# Patient Record
Sex: FEMALE
[De-identification: names, ages, dates, MRNs, and addresses within clinical notes are randomized per-mention and may not be internally consistent; named-entity substitution may affect disease eponyms.]

---

## 2021-08-15 ENCOUNTER — HOSPITAL ENCOUNTER (OUTPATIENT)
Dept: HOSPITAL 53 - M LDO | Age: 26
Discharge: HOME | End: 2021-08-15
Attending: GENERAL PRACTICE
Payer: COMMERCIAL

## 2021-08-15 VITALS — SYSTOLIC BLOOD PRESSURE: 109 MMHG | DIASTOLIC BLOOD PRESSURE: 66 MMHG

## 2021-08-15 VITALS — WEIGHT: 149.47 LBS | BODY MASS INDEX: 30.13 KG/M2 | HEIGHT: 59 IN

## 2021-08-15 VITALS — SYSTOLIC BLOOD PRESSURE: 101 MMHG | DIASTOLIC BLOOD PRESSURE: 63 MMHG

## 2021-08-15 VITALS — DIASTOLIC BLOOD PRESSURE: 57 MMHG | SYSTOLIC BLOOD PRESSURE: 99 MMHG

## 2021-08-15 DIAGNOSIS — O60.03: Primary | ICD-10-CM

## 2021-08-15 DIAGNOSIS — Z3A.34: ICD-10-CM

## 2021-08-15 PROCEDURE — 96372 THER/PROPH/DIAG INJ SC/IM: CPT

## 2021-08-15 PROCEDURE — 59025 FETAL NON-STRESS TEST: CPT

## 2021-08-15 PROCEDURE — 96361 HYDRATE IV INFUSION ADD-ON: CPT

## 2021-08-15 PROCEDURE — 96360 HYDRATION IV INFUSION INIT: CPT

## 2021-08-15 NOTE — IPNPDOC
Obstetrical Progress Note


Date of Service


Aug 15, 2021





Subjective


25 yo  @ 34W5D presents c/o contractions 4 hrs after intercourse. reports

contractions has gotten together and they are about 4/10. she presents becoz 

they was not resolved after a fewhours. denies any vaginal bleeing, loss of 

fluids or decreased fetal movements.





, MOD JUAN,+ACCELS, -Decels-- reactive NST


sve: 1-2/t/h, unchanged after 2 hrs of observation.








A/P


25 yo  @ 34W5D presents c/o contractions 4 hrs after intercourse. 

Hemodynamically stable. no cervical change after 2 hrs, but since initial exam 

was 2 cm, atient was given first dose of betamethasone and will return for 2nd 

dose tomorrow at 6am.


patient was given vistaril for contraction pain, which improved.


she was given strict return precautions and discharged home.


f/u in clinic as previously scheduled.





Objective





Vital Signs








  Date Time  Temp Pulse Resp B/P (MAP) Pulse Ox O2 Delivery O2 Flow Rate FiO2


 


8/15/21 19:11 98.4 84 16 109/66 (80) 98 Room Air  

















LOW PINON MD          Aug 15, 2021 8:47 pm

## 2021-08-16 ENCOUNTER — HOSPITAL ENCOUNTER (OUTPATIENT)
Dept: HOSPITAL 53 - M LDO | Age: 26
Discharge: HOME | End: 2021-08-16
Attending: HEALTH CARE PROVIDER
Payer: COMMERCIAL

## 2021-08-16 VITALS — BODY MASS INDEX: 30.84 KG/M2 | HEIGHT: 59 IN | WEIGHT: 153 LBS

## 2021-08-16 DIAGNOSIS — Z3A.34: ICD-10-CM

## 2021-08-16 DIAGNOSIS — O60.03: Primary | ICD-10-CM

## 2021-08-16 PROCEDURE — 59025 FETAL NON-STRESS TEST: CPT

## 2021-08-16 PROCEDURE — 96372 THER/PROPH/DIAG INJ SC/IM: CPT

## 2021-08-16 NOTE — IPNPDOC
Obstetrical Progress Note


Date of Service


Aug 16, 2021





Subjective


27 yo  @ 34W6D presents for second dose of betamethasone.  Pt initially 

presented over the weekend c/o contractions 4 hrs after intercourse. SVE was 

noted to be 2/T/H and unchanged after 2hr recheck, however due to SVE being 2cm 

with contractions, decision made to provide course of steroids. She is without 

complaint today, denies any further contraction pain.  Second dose administered 

and pt instructed to follow up as scheduled.











RUBY WALLACE M.D.        Aug 16, 2021 18:32

## 2021-08-20 ENCOUNTER — HOSPITAL ENCOUNTER (OUTPATIENT)
Dept: HOSPITAL 53 - M RAD | Age: 26
End: 2021-08-20
Attending: REGISTERED NURSE
Payer: COMMERCIAL

## 2021-08-20 DIAGNOSIS — Z3A.35: ICD-10-CM

## 2021-08-20 DIAGNOSIS — Z36.89: Primary | ICD-10-CM

## 2021-08-20 DIAGNOSIS — O24.419: ICD-10-CM

## 2021-08-20 NOTE — REP
INDICATION:

PREG, FETAL GROWTH-GEST DIABETES



COMPARISON:

None.



TECHNIQUE:

Transabdominal obstetrical ultrasound with color Doppler evaluation.



FINDINGS:

Examination demonstrates a single live intrauterine pregnancy in cephalic

presentation.  Fetal motion is identified by technologist.  Placenta is noted

posterior and  grade 1 without evidence for placenta previa or abruption.  Amniotic

fluid volume is normal.  Cervix measures 5.1 cm in length and appears closed..



Selected gestational age: 35 weeks 3 days with VALE 09/21/2021.

Gestational age by current measurements 35 weeks 2 days with VALE 09/22/2021.



FHR equals 150 beats per minute.



BPD:      8.5 cm at        34 weeks 2 days

HC:         31.9 cm at         35 weeks 6 days

AC:         32.3 cm at      36 weeks 2 days

FL:          6.7 cm at      34 weeks 3 days

HL:          6.1 cm at      35 weeks 2 days

HC/AC: 0.99

Estimated fetal weight 2699 grams (61stpercentile).



ELENO: 8.3 cm (7.8-24.9)

Umbilical artery SD ratio: 1.91 (1.66-3.56)



IMPRESSION:

Single live advanced gestation in cephalic presentation demonstrating appropriate

estimated fetal weight and growth.





<Electronically signed by Ezequiel Miles > 08/20/21 [id number]

## 2021-09-10 ENCOUNTER — HOSPITAL ENCOUNTER (OUTPATIENT)
Dept: HOSPITAL 53 - M LDO | Age: 26
Discharge: HOME | End: 2021-09-10
Attending: OBSTETRICS & GYNECOLOGY
Payer: COMMERCIAL

## 2021-09-10 VITALS — DIASTOLIC BLOOD PRESSURE: 73 MMHG | SYSTOLIC BLOOD PRESSURE: 110 MMHG

## 2021-09-10 DIAGNOSIS — Z3A.38: ICD-10-CM

## 2021-09-10 DIAGNOSIS — O60.03: Primary | ICD-10-CM

## 2021-09-10 PROCEDURE — 59025 FETAL NON-STRESS TEST: CPT

## 2021-09-10 NOTE — IPNPDOC
Text Note


Date of Service


The patient was seen on 9/10/21.





NOTE





25 yo  at 38+2 weeks gestation presents to L&D with the complaint of 

contractions.  She denies any bleeding or leakage of fluid.  She endorses 

regular fetal movement.





Vitals - VSS, afebrile, normotensive, non tachycardic


General - AAOX3, laying in bed, NAD


Abdomen - Gravid uterus, no fundal tenderness


Cervix - 1/50/-3.  Exam unchanged >1hr later.





FHR tracing - Cat I tracing with moderate variability, +accels, no decels, ctx 

intermittent.





Not in active labor.  Reassuring fetal status. Discharged home with return 

precautions.  All questions answered.





30 minutes


Luis Manuel





VS,Rj, I+O


VS, Rj, I+O





Vital Signs








  Date Time  Temp Pulse Resp B/P (MAP) Pulse Ox O2 Delivery O2 Flow Rate FiO2


 


9/10/21 05:31 98.6 93 18 110/73 (85)    

















ISABEL CARPENTER DO         Sep 10, 2021 07:40

## 2021-09-12 ENCOUNTER — HOSPITAL ENCOUNTER (INPATIENT)
Dept: HOSPITAL 53 - M LDO | Age: 26
LOS: 2 days | Discharge: HOME | End: 2021-09-14
Attending: OBSTETRICS & GYNECOLOGY | Admitting: OBSTETRICS & GYNECOLOGY
Payer: COMMERCIAL

## 2021-09-12 VITALS — SYSTOLIC BLOOD PRESSURE: 118 MMHG | DIASTOLIC BLOOD PRESSURE: 76 MMHG

## 2021-09-12 VITALS — SYSTOLIC BLOOD PRESSURE: 118 MMHG | DIASTOLIC BLOOD PRESSURE: 74 MMHG

## 2021-09-12 VITALS — SYSTOLIC BLOOD PRESSURE: 130 MMHG | DIASTOLIC BLOOD PRESSURE: 79 MMHG

## 2021-09-12 VITALS — DIASTOLIC BLOOD PRESSURE: 58 MMHG | SYSTOLIC BLOOD PRESSURE: 95 MMHG

## 2021-09-12 VITALS — SYSTOLIC BLOOD PRESSURE: 152 MMHG | DIASTOLIC BLOOD PRESSURE: 76 MMHG

## 2021-09-12 VITALS — SYSTOLIC BLOOD PRESSURE: 114 MMHG | DIASTOLIC BLOOD PRESSURE: 64 MMHG

## 2021-09-12 VITALS — SYSTOLIC BLOOD PRESSURE: 114 MMHG | DIASTOLIC BLOOD PRESSURE: 77 MMHG

## 2021-09-12 VITALS — SYSTOLIC BLOOD PRESSURE: 123 MMHG | DIASTOLIC BLOOD PRESSURE: 74 MMHG

## 2021-09-12 VITALS — BODY MASS INDEX: 31.07 KG/M2 | HEIGHT: 59 IN | WEIGHT: 154.1 LBS

## 2021-09-12 VITALS — DIASTOLIC BLOOD PRESSURE: 69 MMHG | SYSTOLIC BLOOD PRESSURE: 118 MMHG

## 2021-09-12 VITALS — DIASTOLIC BLOOD PRESSURE: 75 MMHG | SYSTOLIC BLOOD PRESSURE: 114 MMHG

## 2021-09-12 VITALS — DIASTOLIC BLOOD PRESSURE: 51 MMHG | SYSTOLIC BLOOD PRESSURE: 86 MMHG

## 2021-09-12 VITALS — SYSTOLIC BLOOD PRESSURE: 122 MMHG | DIASTOLIC BLOOD PRESSURE: 76 MMHG

## 2021-09-12 VITALS — DIASTOLIC BLOOD PRESSURE: 86 MMHG | SYSTOLIC BLOOD PRESSURE: 134 MMHG

## 2021-09-12 VITALS — SYSTOLIC BLOOD PRESSURE: 120 MMHG | DIASTOLIC BLOOD PRESSURE: 75 MMHG

## 2021-09-12 VITALS — SYSTOLIC BLOOD PRESSURE: 110 MMHG | DIASTOLIC BLOOD PRESSURE: 71 MMHG

## 2021-09-12 VITALS — SYSTOLIC BLOOD PRESSURE: 107 MMHG | DIASTOLIC BLOOD PRESSURE: 73 MMHG

## 2021-09-12 VITALS — SYSTOLIC BLOOD PRESSURE: 123 MMHG | DIASTOLIC BLOOD PRESSURE: 78 MMHG

## 2021-09-12 VITALS — DIASTOLIC BLOOD PRESSURE: 72 MMHG | SYSTOLIC BLOOD PRESSURE: 115 MMHG

## 2021-09-12 DIAGNOSIS — Z3A.38: ICD-10-CM

## 2021-09-12 LAB
BUN SERPL-MCNC: 5 MG/DL (ref 7–18)
CALCIUM SERPL-MCNC: 8.4 MG/DL (ref 8.5–10.1)
CHLORIDE SERPL-SCNC: 107 MEQ/L (ref 98–107)
CO2 SERPL-SCNC: 24 MEQ/L (ref 21–32)
CREAT SERPL-MCNC: 0.38 MG/DL (ref 0.55–1.3)
GFR SERPL CREATININE-BSD FRML MDRD: > 60 ML/MIN/{1.73_M2} (ref 60–?)
GLUCOSE SERPL-MCNC: 70 MG/DL (ref 70–100)
HCT VFR BLD AUTO: 35.3 % (ref 36–47)
HGB BLD-MCNC: 11.7 G/DL (ref 12–15.5)
MCH RBC QN AUTO: 30.2 PG (ref 27–33)
MCHC RBC AUTO-ENTMCNC: 33.1 G/DL (ref 32–36.5)
MCV RBC AUTO: 91.2 FL (ref 80–96)
PLATELET # BLD AUTO: 200 10^3/UL (ref 150–450)
POTASSIUM SERPL-SCNC: 3.7 MEQ/L (ref 3.5–5.1)
RBC # BLD AUTO: 3.87 10^6/UL (ref 4–5.4)
SODIUM SERPL-SCNC: 140 MEQ/L (ref 136–145)
WBC # BLD AUTO: 5.2 10^3/UL (ref 4–10)

## 2021-09-12 PROCEDURE — 10907ZC DRAINAGE OF AMNIOTIC FLUID, THERAPEUTIC FROM PRODUCTS OF CONCEPTION, VIA NATURAL OR ARTIFICIAL OPENING: ICD-10-PCS | Performed by: OBSTETRICS & GYNECOLOGY

## 2021-09-12 RX ADMIN — Medication SCH MLS/HR: at 23:47

## 2021-09-12 NOTE — IPNPDOC
Text Note


Date of Service


The patient was seen on 9/12/21.





NOTE








Patient comfortable with epidural in place.





Chaperoned by RN


Cervix: 8/90/0.  AROM performed productive of clear fluid.





FHR tracing - Cat I with moderate variability, +accels, no decels.  Ctx regular.





Patient progressing well.  Anticipate beginning of 2nd stage soon.





Luis Manuel





VS,Rj, I+O


VS, Rj, I+O


Laboratory Tests


9/12/21 20:11











Vital Signs








  Date Time  Temp Pulse Resp B/P (MAP) Pulse Ox O2 Delivery O2 Flow Rate FiO2


 


9/12/21 19:20 99.0 89 19 134/86 (102)    

















ISABEL CARPENTER DO         Sep 12, 2021 22:20

## 2021-09-12 NOTE — DNPDOC
[facility] Delivery Note


Delivery Note





DATE OF DELIVERY: 2021 at ~2240





PREDELIVERY DIAGNOSIS: 38+5 weeks gestation and active labor





POST DELIVERY DIAGNOSIS: Delivered.





PROCEDURE: Spontaneous vaginal delivery





OBSTETRICIAN: Dr. Issa





ANESTHESIA: Neuraxial (epidural)





ESTIMATED BLOOD LOSS: 200 ml





FINDINGS: pending weight, male infant, Apgar Score 9/9





DELIVERY SUMMARY: Vannesa progressed rapidly after AROM into second stage.  She

started feeling an urge to push and I was called to the room.  The bed was 

broken down and she was prepped for delivery.  With excellent effort over about 

only 5 minutes of pushing her infant delivered.  Presentation was TATYANA with 

restitution to ROT.  The left anterior shoulder delivered with gentle traction 

followed easily by the remainder of the body.  The infant was dried and 

stimulated on the field and a bulb suction was used.  The infant was placed on 

the maternal abdomen and cried vigorously.  The three vessel umbilical cord was 

then clamped and cut by the FOB after appropriate time delay and under my 

direction. Third stage was completed with gentle traction on on the cord and it 

was productive of an intact placenta.  The uterus was firmed with massage and 

pitocin was administered IV bolus.  Inspection of the cervix, vagina, labia, and

perineum revealed no lacerations.  The fundus was palpated again and was firm.  

Sponge, instrument, and needle counts were correct X2.  Mother and infant stable

when I left the room.





DO JAYDEN Cowan CHRISTOPHER J. DO         Sep 12, 2021 22:56

## 2021-09-12 NOTE — HPEPDOC
Obstetrical History & Physical


General


Date of Admission


Sep 12, 2021 at 19:31





History of Present Illness





27 yo  at 38+5 weeks gestation by LMP of [id number] presented to L&D with 

regular, painful contractions.  She also endorses bloody show.  She denies any 

leakage of fluid.  She endorses regular fetal movement.


Chief Complaint:  Contractions, term


Information Provided By:  Patient


Age:  26


:  3


Term:  1


Pre-term:  1


Abortions:  0


Livin





Prenatal Care


Prenatal Care:  Good Care





Prenatal Dating


Final EDC:  Sep 21, 2021


Final EDC for Daily Update:  Sep 21, 2021


Final EDC by:  LMP (LMP of [id number] set VALE of [id number])





Antepartum Course


Prenatal Diagnos(e)s





History of Pre E





Past Medical History


Past Obstetrical History :  


   Past Obstetrical History:  Multigravida (G1 -  at 36 weeks after IOL for 

Pre E, G2 -  at 40 weeks (7lbs, 50z), G3 - current pregnancy)


   Type of Delivery:  Spontaneous Vaginal Del.


GYN History:  No pertinent history


Past Medical History


Medical History





History of Pre E


Surgical History:  Denies/None





Family History


Significant Family History:  No pertinent family hx





Social History


Marital Status:  


Family situation:  Spouse/partner home


Psychosocial History:  No pertinent psych hx


* Smoker:  non-smoker


Alcohol:  Denies


Drugs:  denies





Prenatal Imunizations


Tdap status:  current


Influenza Status:  needs





Allergies


Coded Allergies:  


     No Known Allergies (Unverified , 8/15/21)





Medications


Scheduled


Aspirin (Aspirin) 81 Mg Tab.chew, 1 TAB PO DAILY for pain


Prenatal No.137/Iron/Folic Acd (Prenatal Vitamin Tablet) 1 Each Tablet, 1 TAB PO

DAILY





Physical Examination


Physical Examination





GENERAL: Alert and oriented times three.  Painfully tushar.


ABDOMEN: Gravid and non-tender to touch.


FETUS: Is vertex (VTX) by sterile vaginal examination (SVE),


EXTREMITIES: No edema.





Vital Signs/I&O





Vital Signs








  Date Time  Temp Pulse Resp B/P (MAP) Pulse Ox O2 Delivery O2 Flow Rate FiO2


 


21 19:20 99.0 89 19 134/86 (102)    











Laboratory Data


24H LABS


Laboratory Tests 2


21 19:40: Serology Scanned Report Hepatitis B Testing


Urine Culture:  No Growth





Pertinent Laboratoy Data


Blood Type:  O+


RBC Antibody Screen:  Negative


HIV:  Negative


Hepatitis B:  Negative


Hepatitis C:  Unknown


Rapid Plasma Reagin:  Nonreactive


Rubella:  Immune


Varicella:  Immune


Chlamydia/Gonorrhea:  Unknown


Group B Streptococcus:  Negative


Quad Screen Test:  Unknown (Patient had low risk cff DNA genetic screening)


Cystic Fibrosis:  Unknown


Glucose Tolerance Test:  132





Anatomy Ultrasound


Placenta Location:  Posterior


Normal Anatomy:  Yes


Placenta Previa:  No





 Steroid Therapy


 Steroid Therapy:  No





Vaginal Examination


Dilation:  6 cm


Effacement:  80%


Station:  -2


Cervical Consistency:  Soft


Cervical Position:  Middle


Fetal Presentation:  Cephalic presentation


Fetal Position:  Vertex (occiput)





Fetal Assessment


Fetal Heart Rate (FHR):  130


Variability:  Moderate


Accelerations:  Positive


Decelerations:  None





Tocometer


Contractions:  Yes


Frequency:  regular


Strength:  palpated as strong





Assessment/Plan


Assessment





27 yo  at 38+5 weeks gestation presented to L&D in active labor.





Plan





Admit to L&D for expectant management of labor.  Will augment as clinically 

indicated.


Apply IV fluids.


Labs per L&D protocol.


GBS negative.


Clear liquid diet.


Patient may have epidural if desired.


Anticipate .





Labor and Delivery Counseling





Vaginal / Operative vaginal delivery  / C section counseling





We will deliver your baby through the vagina with possible assistance of forceps

or vacuum device if needed for maternal or fetal indications. Forceps and vacuum

are devices that can assist with vaginal delivery when normal pushing efforts 

cannot achieve delivery on their own or when delivery is needed in an emergency 

for baby's well-being. Medications may be required to induce or augment (help) 

your labor in order to achieve a vaginal delivery. An episiotomy may be required

to help your baby to delivery vaginally. You may also require repair of any 

lacerations or tears of your vagina or vulva that are caused by delivery. In 

some cases, emergencies can occur that require an emergency  section 

delivery so quickly that there may not be enough time to stop and complete 

consent forms for  section. Understand that if this occurs, your 

providers will discuss the need for a  section with you before they 

proceed with surgery.  section is the delivery of your baby through an 

incision in your abdomen. In some situations,  section may be safer to 

mom and baby than continuing labor and is only performed when clinically 

indicated. 





Risks of vaginal delivery include but are not limited to: Bleeding, infection, 

injury to the vagina, pelvic structures, injury to baby, damage to the uterus, 

reactions to anesthesia, uterine rupture, risk of hysterectomy for life 

threatening bleeding, or death. Medications used to induce or augment labor may 

increase your risk for infection, uterine tachysystole, uterine rupture, fetal 

heart rate abnormalities, need for emergency  delivery or possible 

 hysterectomy, and postpartum hemorrhage. Additional risks for use of 

forceps and vacuum include: increased risk of perineal and vaginal lacerations, 

risk of urinary or bowel incontinence, increased risk of injury to baby with 

bruising, scratches, hematomas on the head, or intracranial bleeding. 





Vannesa appears to understand these risks and elects to proceed with her labor 

at this location.  She also consents to a blood transfusion if necessary.  All 

patient and  questions answered.





Nestor Issa DO, NESTOR YOUNGBLOOD DO         Sep 12, 2021 20:16

## 2021-09-13 VITALS — DIASTOLIC BLOOD PRESSURE: 50 MMHG | SYSTOLIC BLOOD PRESSURE: 97 MMHG

## 2021-09-13 VITALS — SYSTOLIC BLOOD PRESSURE: 113 MMHG | DIASTOLIC BLOOD PRESSURE: 72 MMHG

## 2021-09-13 VITALS — SYSTOLIC BLOOD PRESSURE: 137 MMHG | DIASTOLIC BLOOD PRESSURE: 85 MMHG

## 2021-09-13 VITALS — DIASTOLIC BLOOD PRESSURE: 67 MMHG | SYSTOLIC BLOOD PRESSURE: 117 MMHG

## 2021-09-13 VITALS — DIASTOLIC BLOOD PRESSURE: 72 MMHG | SYSTOLIC BLOOD PRESSURE: 115 MMHG

## 2021-09-13 VITALS — DIASTOLIC BLOOD PRESSURE: 74 MMHG | SYSTOLIC BLOOD PRESSURE: 112 MMHG

## 2021-09-13 VITALS — DIASTOLIC BLOOD PRESSURE: 81 MMHG | SYSTOLIC BLOOD PRESSURE: 128 MMHG

## 2021-09-13 VITALS — SYSTOLIC BLOOD PRESSURE: 121 MMHG | DIASTOLIC BLOOD PRESSURE: 73 MMHG

## 2021-09-13 VITALS — SYSTOLIC BLOOD PRESSURE: 125 MMHG | DIASTOLIC BLOOD PRESSURE: 86 MMHG

## 2021-09-13 VITALS — DIASTOLIC BLOOD PRESSURE: 85 MMHG | SYSTOLIC BLOOD PRESSURE: 125 MMHG

## 2021-09-13 VITALS — DIASTOLIC BLOOD PRESSURE: 72 MMHG | SYSTOLIC BLOOD PRESSURE: 131 MMHG

## 2021-09-13 VITALS — DIASTOLIC BLOOD PRESSURE: 78 MMHG | SYSTOLIC BLOOD PRESSURE: 131 MMHG

## 2021-09-13 VITALS — DIASTOLIC BLOOD PRESSURE: 69 MMHG | SYSTOLIC BLOOD PRESSURE: 109 MMHG

## 2021-09-13 VITALS — DIASTOLIC BLOOD PRESSURE: 76 MMHG | SYSTOLIC BLOOD PRESSURE: 122 MMHG

## 2021-09-13 VITALS — DIASTOLIC BLOOD PRESSURE: 74 MMHG | SYSTOLIC BLOOD PRESSURE: 122 MMHG

## 2021-09-13 VITALS — SYSTOLIC BLOOD PRESSURE: 120 MMHG | DIASTOLIC BLOOD PRESSURE: 75 MMHG

## 2021-09-13 VITALS — DIASTOLIC BLOOD PRESSURE: 73 MMHG | SYSTOLIC BLOOD PRESSURE: 116 MMHG

## 2021-09-13 VITALS — DIASTOLIC BLOOD PRESSURE: 74 MMHG | SYSTOLIC BLOOD PRESSURE: 115 MMHG

## 2021-09-13 VITALS — SYSTOLIC BLOOD PRESSURE: 121 MMHG | DIASTOLIC BLOOD PRESSURE: 71 MMHG

## 2021-09-13 VITALS — DIASTOLIC BLOOD PRESSURE: 70 MMHG | SYSTOLIC BLOOD PRESSURE: 113 MMHG

## 2021-09-13 VITALS — SYSTOLIC BLOOD PRESSURE: 116 MMHG | DIASTOLIC BLOOD PRESSURE: 73 MMHG

## 2021-09-13 VITALS — SYSTOLIC BLOOD PRESSURE: 120 MMHG | DIASTOLIC BLOOD PRESSURE: 71 MMHG

## 2021-09-13 VITALS — SYSTOLIC BLOOD PRESSURE: 128 MMHG | DIASTOLIC BLOOD PRESSURE: 75 MMHG

## 2021-09-13 VITALS — DIASTOLIC BLOOD PRESSURE: 70 MMHG | SYSTOLIC BLOOD PRESSURE: 109 MMHG

## 2021-09-13 VITALS — DIASTOLIC BLOOD PRESSURE: 76 MMHG | SYSTOLIC BLOOD PRESSURE: 118 MMHG

## 2021-09-13 VITALS — DIASTOLIC BLOOD PRESSURE: 75 MMHG | SYSTOLIC BLOOD PRESSURE: 117 MMHG

## 2021-09-13 VITALS — DIASTOLIC BLOOD PRESSURE: 70 MMHG | SYSTOLIC BLOOD PRESSURE: 115 MMHG

## 2021-09-13 RX ADMIN — Medication SCH TAB: at 09:00

## 2021-09-13 RX ADMIN — IBUPROFEN PRN MG: 800 TABLET, FILM COATED ORAL at 15:07

## 2021-09-13 RX ADMIN — IBUPROFEN PRN MG: 800 TABLET, FILM COATED ORAL at 04:41

## 2021-09-13 RX ADMIN — Medication SCH MLS/HR: at 03:40

## 2021-09-13 RX ADMIN — IBUPROFEN PRN MG: 800 TABLET, FILM COATED ORAL at 02:40

## 2021-09-13 NOTE — IPNPDOC
Postpartum Progress Note


Date of Service:  Sep 13, 2021


Postpartum Progress Note





Called to room for persistent postpartum bleeding.  Not heavy, but continuous 

with passage of some clots.  Uterine sweep performed with small clots removed.  

Another bag of pitocin was hung, 1000 mcg OK cytotec was administered, 0.2mg IM 

methergine was given, and 1000mg of IV TXA was started.  A plasencia catheter was 

placed and 300ml of clear urine resulted.  A speculum exam was done.  The cervix

was carefully inspected and there were no bleeding lacerations.  A bedside TAUS 

was performed and the EMS was thin measuring 1.2cm at its thickest dimension.  

Vaginal packing was placed due to some oozing abrasions but there were no 

lacerations requiring repair.  2gm Ancef administered due to uterine sweep.  

hemorrhage was weighed resulting in 229ml.  This brings total EBL to 429ml 

including delivery.  Patient remained stable throughout with normal vital signs.

 Will keep packing in place until the morning and continue to monitor closely.  

All patient and  questions answered.





Luis Manuel





VS, I&O, 24H, Rj


Vital Signs/I&O





Vital Signs








  Date Time  Temp Pulse Resp B/P (MAP) Pulse Ox O2 Delivery O2 Flow Rate FiO2


 


9/13/21 00:18  88 18 122/74 (90)    


 


9/12/21 23:55 98.5       














I&O- Last 24 Hours up to 6 AM 


 


 9/13/21





 06:00


 


Intake Total 2166 ml


 


Output Total 200 ml


 


Balance 1966 ml











Laboratory Data


24H LABS


Laboratory Tests 2


9/12/21 19:40: Serology Scanned Report Hepatitis B Testing


9/12/21 20:11: 


Nucleated Red Blood Cells % (auto) 0.0, Anion Gap 9, Glomerular Filtration Rate 

> 60.0, Calcium Level 8.4L


CBC/BMP


Laboratory Tests


9/12/21 20:11

















ISABEL CARPENTER DO         Sep 13, 2021 01:08

## 2021-09-13 NOTE — IPNPDOC
Postpartum Progress Note


Date of Service:  Sep 13, 2021


Postpartum Progress Note





25 yo G3 now P3 s/p  yesterday evening followed by persistent postpartum 

bleeding requiring treatment with multiple uterotonics and TXA.  She also 

received Ancef 2gms for a uterine sweep.  Vaginal packing was placed in addition

to a plasencia catheter.  No acute events since.





Vannesa reports feeling well this AM.  She has been able to rest.  She denies 

feeling any bleeding.





Chaperoned by RN


Vitals - Temp of 100.4 at ~0430.  Normotensive, non tachycardic.


General - laying in bed, NAD


Abdomen - Fundus firm at U-1.  No fundal tenderness.


Pelvic - Vaginal packing removed intact.  No further bleeding.





 - plasencia catheter in place draining clear urine





Vannesa remains stable.  Will ambulate today and remove plasencia catheter.  Note 

made of elevated temp.  Will continue to monitor closely.  Continue routine 

postpartum care.  All questions answered.





Luis Manuel





VS, I&O, 24H, Rj


Vital Signs/I&O





Vital Signs








  Date Time  Temp Pulse Resp B/P (MAP) Pulse Ox O2 Delivery O2 Flow Rate FiO2


 


21 06:00 98.5 76 16 109/69 (82) 97 Room Air  














I&O- Last 24 Hours up to 6 AM 


 


 21





 06:00


 


Intake Total 2396 ml


 


Output Total 1950 ml


 


Balance 446 ml











Laboratory Data


24H LABS


Laboratory Tests 2


21 19:40: Serology Scanned Report Hepatitis B Testing


21 20:11: 


Nucleated Red Blood Cells % (auto) 0.0, Anion Gap 9, Glomerular Filtration Rate 

> 60.0, Calcium Level 8.4L


CBC/BMP


Laboratory Tests


21 20:11

















ISABEL CARPENTER DO         Sep 13, 2021 07:27

## 2021-09-14 VITALS — DIASTOLIC BLOOD PRESSURE: 69 MMHG | SYSTOLIC BLOOD PRESSURE: 110 MMHG

## 2021-09-14 VITALS — SYSTOLIC BLOOD PRESSURE: 115 MMHG | DIASTOLIC BLOOD PRESSURE: 78 MMHG

## 2021-09-14 VITALS — SYSTOLIC BLOOD PRESSURE: 113 MMHG | DIASTOLIC BLOOD PRESSURE: 69 MMHG

## 2021-09-14 RX ADMIN — Medication SCH TAB: at 09:45

## 2021-09-14 NOTE — IPNPDOC
Postpartum Progress Note


Date of Service:  Sep 14, 2021


Postpartum Progress Note


SUBJECT: Ms. Salazar is a 25yo  PPD2 after a vaginal delivery, male 

infant, Apgar Score 9/9, no lacerations. She did have postpartum uterine atony 

requiring uterotonics, TXA and received 2g of ancef; EBL 429cc. She has been 

ambulating, voiding spontaneously without issue and tolerating regular diet. 

Reports lochia is less than a normal period. Patient is ambulating well. She has

decided not to breastfeed. Denies any pain. Voiding and passing flatus without 

difficulty.





OBJECTIVE: 


VITAL SIGNS: Within normal limits, afebrile.


Alert and oriented times three.


No increased WOB


Heart rate: non-tachy


Abdomen: Fundus firm at U-2. Soft, NTTP.


[Minimal] lochia per pt





ASSESSMENT: Ms. Salazar is a 25yo  PPD2 after a vaginal delivery, male 

infant, Apgar Score 9/9, no lacerations. She did have postpartum uterine atony 

requiring uterotonics, TXA and received 2g of ancef; EBL 429cc. Vitals within 

normal limits, afebrile, hemodynamically stable with no evidence of infection.





PLAN:


1. Discharge to home today.


2. Tylenol and Motrin for pain.


3. Encourage breast feeding and ambulation.


4. Plans on mirena for contraception.


5. Routine PP visit in 6 weeks in clinic.


6. Discussed return precautions at length and activity limitations (pelvic 

rest).





VS, I&O, 24H, Fishbone


Vital Signs/I&O





Vital Signs








  Date Time  Temp Pulse Resp B/P (MAP) Pulse Ox O2 Delivery O2 Flow Rate FiO2


 


21 01:55 98.0 68 16 113/69 (84) 96 Room Air  














I&O- Last 24 Hours up to 6 AM 


 


 21





 06:00


 


Output Total 1100 ml


 


Balance -1100 ml











Laboratory Data


24H LABS


Laboratory Tests 2


21 08:13: 











HUMES,JAMIE C. DO              Sep 14, 2021 03:58

## 2021-09-14 NOTE — OBDS
[facility] Obstetrical Discharge Sum.


A/P, Post Partum Course


List any complications


Ms. Salazar is a 25yo  PPD2 after a vaginal delivery, male infant, Apgar 

Score 9/9, no lacerations. She did have postpartum uterine atony requiring 

uterotonics, TXA and received 2g of ancef; EBL 429cc. She has been ambulating, 

voiding spontaneously without issue and tolerating regular diet. Reports lochia 

is less than a normal period. Patient is ambulating well. She has decided not to

breastfeed. Denies any pain. Voiding and passing flatus without difficulty. 

Denies any pain. Voiding and passing flatus without difficulty. Vitals within 

normal limits, afebrile, hemodynamically stable with no evidence of infection.





PLAN:


1. Discharge to home today.


2. Tylenol and Motrin for pain.


3. Encourage breast feeding and ambulation.


4. Plans on mirena for contraception.


5. Routine PP visit in 6 weeks in clinic.


6. Discussed return precautions at length and activity limitations (pelvic 

rest).











HUMES,JAMIE C. DO              Sep 14, 2021 04:00